# Patient Record
Sex: FEMALE | Race: WHITE | NOT HISPANIC OR LATINO | URBAN - METROPOLITAN AREA
[De-identification: names, ages, dates, MRNs, and addresses within clinical notes are randomized per-mention and may not be internally consistent; named-entity substitution may affect disease eponyms.]

---

## 2018-12-30 ENCOUNTER — EMERGENCY (EMERGENCY)
Facility: HOSPITAL | Age: 33
LOS: 1 days | Discharge: HOME | End: 2018-12-30
Attending: EMERGENCY MEDICINE | Admitting: EMERGENCY MEDICINE
Payer: COMMERCIAL

## 2018-12-30 VITALS
TEMPERATURE: 98 F | OXYGEN SATURATION: 98 % | RESPIRATION RATE: 16 BRPM | HEIGHT: 65 IN | HEART RATE: 100 BPM | SYSTOLIC BLOOD PRESSURE: 121 MMHG | WEIGHT: 130.95 LBS | DIASTOLIC BLOOD PRESSURE: 81 MMHG

## 2018-12-30 DIAGNOSIS — R09.81 NASAL CONGESTION: ICD-10-CM

## 2018-12-30 PROCEDURE — 99282 EMERGENCY DEPT VISIT SF MDM: CPT

## 2018-12-30 NOTE — ED PROVIDER NOTE - ATTENDING CONTRIBUTION TO CARE
Dr. Mendez: I performed a face to face bedside interview with patient regarding history of present illness, review of symptoms and past medical history. I completed an independent physical exam.  I have discussed patient's plan of care with PA.   I agree with note as stated above, having amended the EMR as needed to reflect my findings.   This includes HISTORY OF PRESENT ILLNESS, HIV, PAST MEDICAL/SURGICAL/FAMILY/SOCIAL HISTORY, ALLERGIES AND HOME MEDICATIONS, REVIEW OF SYSTEMS, PHYSICAL EXAM, and any PROGRESS NOTES during the time I functioned as the attending physician for this patient.  Gen:  Well appearning in NAD  Head:  NC/AT, right nares with swollen erythematous mucosa  Resp: No distress , lungs cta  Ext: no deformities  Skin: warm and dry as visualized

## 2018-12-30 NOTE — ED PROVIDER NOTE - NSFOLLOWUPINSTRUCTIONS_ED_ALL_ED_FT
Follow up with your primary physician for a post hospital visit within 48 hours, taking all results from the ER to be reviewed.  You can continue with your decongestant and ,  in addition add a Flonase 2 sprays per nostril daily.   If any worsening, concerning or new signs or symptoms return to the ER.

## 2018-12-30 NOTE — ED PROVIDER NOTE - OBJECTIVE STATEMENT
32 yo female, no medical hx presents to the ED co nasal/sinus congestion over the past 3-4 days . Was improving with over the counter decongestants but felt the congestion came back this morning so decided to come to the ED. Denies any cough, fevers, chills, sore throat, recent travel , colored mucous.    Admits to mild right lower back discomfort over the past two days sometimes with positional change. Finds relief with advil. Denies any numbness, tingling, weakness to the extremities, urinary complaints, abdomimal pain or any other complaints.

## 2021-05-18 PROBLEM — Z00.00 ENCOUNTER FOR PREVENTIVE HEALTH EXAMINATION: Status: ACTIVE | Noted: 2021-05-18

## 2021-05-19 ENCOUNTER — APPOINTMENT (OUTPATIENT)
Dept: RADIOLOGY | Facility: HOSPITAL | Age: 36
End: 2021-05-19

## 2022-02-09 NOTE — ED PROVIDER NOTE - CPE EDP CARDIAC NORM
normal... Hydroxychloroquine Pregnancy And Lactation Text: This medication has been shown to cause fetal harm but it isn't assigned a Pregnancy Risk Category. There are small amounts excreted in breast milk.

## 2022-05-18 NOTE — ED PROVIDER NOTE - MUSCULOSKELETAL, MLM
Patient of Dr Meyer Companion calling stating she is out of the medication and finds that it really works for her, she has extreme acid reflux and is wondering what she can do to obtain a refill 
Spine appears normal, range of motion is not limited, no muscle or joint tenderness

## 2022-08-16 ENCOUNTER — RESULT REVIEW (OUTPATIENT)
Age: 37
End: 2022-08-16

## 2023-10-03 ENCOUNTER — APPOINTMENT (OUTPATIENT)
Dept: VASCULAR SURGERY | Facility: CLINIC | Age: 38
End: 2023-10-03
Payer: MEDICAID

## 2023-10-03 VITALS
SYSTOLIC BLOOD PRESSURE: 95 MMHG | BODY MASS INDEX: 25.49 KG/M2 | HEART RATE: 71 BPM | DIASTOLIC BLOOD PRESSURE: 58 MMHG | HEIGHT: 65 IN | WEIGHT: 153 LBS

## 2023-10-03 DIAGNOSIS — I87.2 VENOUS INSUFFICIENCY (CHRONIC) (PERIPHERAL): ICD-10-CM

## 2023-10-03 PROCEDURE — 99204 OFFICE O/P NEW MOD 45 MIN: CPT

## 2023-10-03 PROCEDURE — 93971 EXTREMITY STUDY: CPT | Mod: LT

## 2024-01-09 ENCOUNTER — APPOINTMENT (OUTPATIENT)
Dept: VASCULAR SURGERY | Facility: CLINIC | Age: 39
End: 2024-01-09